# Patient Record
(demographics unavailable — no encounter records)

---

## 2024-10-25 NOTE — HISTORY OF PRESENT ILLNESS
[FreeTextEntry1] :  NIRANJAN GOLD is a 62 year male with CC of question of stone in bladder and BPH with symptoms.  Patient was treated with a cystoscopy, revealing a degree of obstruction by the prostate. No stone was found in the bladder. The patient also complains of back pain. He had a recent lumbar spine series done, which showed degenerative changes in the lumbar spine.

## 2024-10-25 NOTE — END OF VISIT
[FreeTextEntry3] : recommendation: I recommended that the patient go to the Department of Neurosurgery. He will start Tamsulosin HCl - 0.4 MG Oral Capsule; TAKE 1 CAPSULE AT BEDTIME and follow up with a TRUSP in three or four weeks. All medical record entries made by Cinthya Sylvester ([facility]) were at my, Dr. Ochoa, direction and personally dictated by me on 10/25/2024. I have reviewed the chart and agree that the record accurately reflects my personal performance of the history, physical exam, assessment, and plan. I have also personally directed, reviewed, and agreed with the chart.

## 2024-11-27 NOTE — ASSESSMENT
[FreeTextEntry1] : IMP: Pt doing well on current prostate medications and erectile dysfunction, unresponsive to oral medications.

## 2024-11-27 NOTE — HISTORY OF PRESENT ILLNESS
[FreeTextEntry1] :  NIRANJAN GOLD is a 62 year male with CC of TRUSP. Pt is doing well and responding well to current medications. He now approaches the topic of erectile dysfunction. At his request, I had prescribed Cialis 5mg, and he has noted no results on this medication. His testosterone was good at over 300.

## 2025-03-03 NOTE — PHYSICAL EXAM
[Alert] : alert [Well Nourished] : well nourished [No Acute Distress] : no acute distress [Normal Sclera/Conjunctiva] : normal sclera/conjunctiva [No Neck Mass] : no neck mass was observed [No LAD] : no lymphadenopathy [Thyroid Not Enlarged] : the thyroid was not enlarged [No Respiratory Distress] : no respiratory distress [No Accessory Muscle Use] : no accessory muscle use [Clear to Auscultation] : lungs were clear to auscultation bilaterally [Normal S1, S2] : normal S1 and S2 [Normal Rate] : heart rate was normal [Regular Rhythm] : with a regular rhythm [Normal Gait] : normal gait [No Tremors] : no tremors [Oriented x3] : oriented to person, place, and time

## 2025-03-03 NOTE — ASSESSMENT
[FreeTextEntry1] : 62 year old male with PMH of  HTN, HLD, DM type 2 ,cardiomyopathy, fatty liver, here for  follow up of Transsphenoidal resection of pituitary macroadenoma in 3/24 and DM type 2.  Post op labs were fine,  T4 7.6, FSH 3.6, LH 3.6, Prolactin 10.2, AM cortisol came back 25. He was discharged home without steroids as he did not need them. Had  CT brain on 3/28/24, post op changes seen.   Pituitary adenoma s/p resection: we checked all pituitary hormones, TFTs, prolactin, IGF-1, 8 am cortisol, testosterone, FSH, LH. All came back normal, except TFTs not done, patient did not get them done, orders provided again. Had repeat labs this visit, serum cortisol, IGF-1, Prolactin, FT4, T3 came back normal.   Diabetes type 2 : a1c 7.5%, 7.1% in 7/24, 8.2% in 2/25 To go up on  metformin  500 mg 4 pills daily. diet and exercise discussed in detail. LDL: 70 No albuminuria.  RTC in 6 months

## 2025-03-03 NOTE — HISTORY OF PRESENT ILLNESS
[FreeTextEntry1] : 62 year old male with PMH of  HTN, HLD, DM type 2 ,cardiomyopathy, fatty liver, here for  follow up of Transsphenoidal resection of pituitary macroadenoma and DM type 2.   He said he was seeing a Urologist for his ED but the medication  that was given for ED was not working and he also saw another urologist but he continue to have the problem and it was his urologist who did an extensive work up including MRI which showed the pituitary adenoma.  ( MRI on 8/18/23: Heterogeneous mass with in the sella turcica eccentric to the left measuring 1.8 x 2.0 x 1.7 cm (AP by width by craniocaudad). Deviation of the pituitary infundibulum towards the right.  Extension towards the left cavernous sinus with the mass reaching the median intercarotid line. Approximately 180 degrees abutment of the cavernous left internal carotid artery.  Mild suprasellar extension. No compression of the optic chiasm. Hypothalamus is within normal limits. Findings are most compatible with a macroadenoma)   He had no symptoms or complaints at the time of admission. Per wife his main complains pre surgery were fatigue and low sex drive, no galactorrhea, no LOC, no weight changes. Now s/p endoscopic transphenoidal resection of a pituitary macroadenoma  3/28/24- nasoseptal free flap. Post op period  went well Post op labs were fine, . T4 7.6, FSH 3.6, LH 3.6, Prolactin 10.2, AM cortisol came back 25 .He was discharged home without steroids as he did not need them.  Here for follow up. Feels well, no headaches, no visual changes. NO LOC, no dizziness.No weight changes. No polydepsia, no polyuria. Has h/o DM type 2, a1c 7.5% , 7.1% in 7/24, 8.2% in 2/25 on metformin 500 mg 3 pills daily. stopped checking. Says he was not good with diet, now better. Started walk again.

## 2025-03-03 NOTE — REVIEW OF SYSTEMS
[Headaches] : headaches [Fatigue] : no fatigue [Decreased Appetite] : appetite not decreased [Recent Weight Gain (___ Lbs)] : no recent weight gain [Recent Weight Loss (___ Lbs)] : no recent weight loss [Visual Field Defect] : no visual field defect [Dysphagia] : no dysphagia [Chest Pain] : no chest pain [Palpitations] : no palpitations [Shortness Of Breath] : no shortness of breath [Cough] : no cough [Nausea] : no nausea [Abdominal Pain] : no abdominal pain [Vomiting] : no vomiting [Polyuria] : no polyuria [Joint Pain] : no joint pain [Myalgia] : no myalgia  [Acanthosis] : no acanthosis  [Dizziness] : no dizziness [Tremors] : no tremors [Pain/Numbness of Digits] : no pain/numbness of digits [Depression] : no depression [Insomnia] : no insomnia [Polydipsia] : no polydipsia [Cold Intolerance] : no cold intolerance [Heat Intolerance] : no heat intolerance

## 2025-06-04 NOTE — END OF VISIT
[FreeTextEntry3] : Recommendation: The patient is doing well on current medications. A prescription for 0.3cc injections of 30/4/100 TriMix was given and the patient will have his medications delivered to him.  The patient will follow up in 6 months with a PSA.

## 2025-06-04 NOTE — ADDENDUM
[FreeTextEntry1] : I, Xioamra Diana, acted as a scribe on behalf of Dr. Ochoa 06/04/2025.   All medical record entries made by the Scribe were at my, Dr. Ochoa, direction and personally dictated by me on 06/04/2025. I have reviewed the chart and agree that the record accurately reflects my personal performance of the history, physical exam, assessment, and plan. I have also personally directed, reviewed, and agreed with the chart.

## 2025-06-04 NOTE — HISTORY OF PRESENT ILLNESS
[FreeTextEntry1] : NIRANJAN GOLD is a 62 year old male doing well overall. His PSA from 9/5/24 was 0.92. The patient's primary concern is now ED. He reports success with an at-home injection that was given by this office several months ago. This injection was 0.3cc of 30/4/100 TriMix.

## 2025-07-21 NOTE — CONSULT LETTER
[Courtesy Letter:] : I had the pleasure of seeing your patient, [unfilled], in my office today. [Sincerely,] : Sincerely, [Dear  ___] : Dear  [unfilled], [FreeTextEntry1] :  Subjective:   - Summary: Mr. Suresh Morales is here for annual surveillance follow-up after resection of a pituitary macroadenoma in spring 2024. Initially presented with changes in libido, low testosterone, and elevated prolactin. Post-surgery, endocrine status normalized. Patient reports no vision changes or headaches but complains of back pain.   - Chief Complaint (CC): Annual follow-up for pituitary macroadenoma post-resection   - History of Present Illness (HPI): Mr. Morales underwent resection of a pituitary macroadenoma in spring 2024. The tumor was initially identified due to changes in libido, low testosterone, and elevated prolactin. Imaging showed a macroadenoma with supracellar extension and deflection of the optic chiasm, but no vision impairment. Post-surgery, his endocrine status normalized with a normal prolactin level in March 2025. He has not had an updated MRI this year. The patient denies any vision changes or headaches but reports back pain, particularly when bending his neck.   - Past Medical History: Pituitary macroadenoma   - Past Surgical History: Pituitary macroadenoma resection in spring 2024   - Family History: Father had similar spinal issues   - Social History: Lives in [place]   - Review of Systems: Denies vision changes, denies headaches. Reports back pain and neck tenderness.   - Medications:    - Allergies:    Objective:   - Diagnostic Results: March 2025 prolactin level: normal. Previous year's MRI showed small areas on the left toward the cavernous sinus, right, and inferiorly, possibly postoperative changes. CT scan of abdomen incidentally showed spinal ankylosis.   - Vital Signs:    - Physical Examination (PE): Not performed during this visit. Noted poor posture in recent photographs.   Assessment:   - Summary: Mr. Morales is doing well post pituitary macroadenoma resection with normalized endocrine function. However, he has developed significant back pain likely due to spinal ankylosis, which was incidentally found on a previous abdominal CT scan.   - Problems:     - Status post pituitary macroadenoma resection     - Spinal ankylosis     - Chronic back pain   - Differential Diagnosis:     - Residual or recurrent pituitary tumor     - Ankylosing spondylitis     - Degenerative disc disease     - Postural syndrome   Plan:   - Summary: The plan is to continue surveillance for the pituitary tumor and address the newly identified spinal issues. We will order imaging studies to further evaluate both conditions and initiate physical therapy for the back pain.   - Plan:     - Order MRI of the pituitary to assess for any residual or recurrent tumor     - Order CT scans of thoracic and cervical spine to evaluate the extent of spinal ankylosis     - Prescribe physical therapy for back pain and to improve posture     - Recommend ophthalmology follow-up for visual field testing     - Continue endocrinology follow-up with Dr. Coronado     - Educate patient on the importance of maintaining good posture to prevent further spinal fusion in poor alignment     - Follow-up after imaging studies to discuss results and further management   [FreeTextEntry3] : Kye Henriquez MD, PhD, FRCPSC   Attending Neurosurgeon   of Neurosurgery  [facility]  [address], [address]nd [place] [address]  Office: [phone]  Fax: [phone]